# Patient Record
Sex: FEMALE | Race: BLACK OR AFRICAN AMERICAN
[De-identification: names, ages, dates, MRNs, and addresses within clinical notes are randomized per-mention and may not be internally consistent; named-entity substitution may affect disease eponyms.]

---

## 2018-02-09 ENCOUNTER — HOSPITAL ENCOUNTER (EMERGENCY)
Dept: HOSPITAL 88 - ER | Age: 48
LOS: 1 days | Discharge: HOME | End: 2018-02-10
Payer: COMMERCIAL

## 2018-02-09 VITALS — WEIGHT: 289 LBS | HEIGHT: 68 IN | BODY MASS INDEX: 43.8 KG/M2

## 2018-02-09 DIAGNOSIS — N30.00: ICD-10-CM

## 2018-02-09 DIAGNOSIS — Z86.73: ICD-10-CM

## 2018-02-09 DIAGNOSIS — R07.89: Primary | ICD-10-CM

## 2018-02-09 LAB
ALBUMIN SERPL-MCNC: 3.9 G/DL (ref 3.5–5)
ALBUMIN/GLOB SERPL: 0.8 {RATIO} (ref 0.8–2)
ALP SERPL-CCNC: 73 IU/L (ref 40–150)
ALT SERPL-CCNC: 18 IU/L (ref 0–55)
ANION GAP SERPL CALC-SCNC: 16.2 MMOL/L (ref 8–16)
BACTERIA URNS QL MICRO: (no result) /HPF
BASOPHILS # BLD AUTO: 0 10*3/UL (ref 0–0.1)
BASOPHILS NFR BLD AUTO: 0.3 % (ref 0–1)
BILIRUB UR QL: NEGATIVE
BUN SERPL-MCNC: 15 MG/DL (ref 7–26)
BUN/CREAT SERPL: 19 (ref 6–25)
CALCIUM SERPL-MCNC: 9.1 MG/DL (ref 8.4–10.2)
CHLORIDE SERPL-SCNC: 104 MMOL/L (ref 98–107)
CK MB SERPL-MCNC: 1.2 NG/ML (ref 0–5)
CK SERPL-CCNC: 137 IU/L (ref 29–168)
CLARITY UR: (no result)
CO2 SERPL-SCNC: 22 MMOL/L (ref 22–29)
COLOR UR: YELLOW
DEPRECATED NEUTROPHILS # BLD AUTO: 3.2 10*3/UL (ref 2.1–6.9)
DEPRECATED RBC URNS MANUAL-ACNC: (no result) /HPF (ref 0–5)
EGFRCR SERPLBLD CKD-EPI 2021: > 60 ML/MIN (ref 60–?)
EOSINOPHIL # BLD AUTO: 0.1 10*3/UL (ref 0–0.4)
EOSINOPHIL NFR BLD AUTO: 1.7 % (ref 0–6)
EPI CELLS URNS QL MICRO: (no result) /LPF
ERYTHROCYTE [DISTWIDTH] IN CORD BLOOD: 12.6 % (ref 11.7–14.4)
GLOBULIN PLAS-MCNC: 4.7 G/DL (ref 2.3–3.5)
GLUCOSE SERPLBLD-MCNC: 86 MG/DL (ref 74–118)
HCT VFR BLD AUTO: 41.2 % (ref 34.2–44.1)
HGB BLD-MCNC: 13.9 G/DL (ref 12–16)
KETONES UR QL STRIP.AUTO: NEGATIVE
LEUKOCYTE ESTERASE UR QL STRIP.AUTO: (no result)
LYMPHOCYTES # BLD: 2 10*3/UL (ref 1–3.2)
LYMPHOCYTES NFR BLD AUTO: 35.3 % (ref 18–39.1)
MCH RBC QN AUTO: 31.8 PG (ref 28–32)
MCHC RBC AUTO-ENTMCNC: 33.7 G/DL (ref 31–35)
MCV RBC AUTO: 94.3 FL (ref 81–99)
MONOCYTES # BLD AUTO: 0.4 10*3/UL (ref 0.2–0.8)
MONOCYTES NFR BLD AUTO: 7.1 % (ref 4.4–11.3)
NEUTS SEG NFR BLD AUTO: 55.4 % (ref 38.7–80)
NITRITE UR QL STRIP.AUTO: NEGATIVE
PLATELET # BLD AUTO: 216 X10E3/UL (ref 140–360)
POTASSIUM SERPL-SCNC: 5.2 MMOL/L (ref 3.5–5.1)
PROT UR QL STRIP.AUTO: NEGATIVE
RBC # BLD AUTO: 4.37 X10E6/UL (ref 3.6–5.1)
SODIUM SERPL-SCNC: 137 MMOL/L (ref 136–145)
SP GR UR STRIP: 1.02 (ref 1.01–1.02)
UROBILINOGEN UR STRIP-MCNC: 0.2 MG/DL (ref 0.2–1)
WBC #/AREA URNS HPF: (no result) /HPF (ref 0–5)

## 2018-02-09 PROCEDURE — 93005 ELECTROCARDIOGRAM TRACING: CPT

## 2018-02-09 PROCEDURE — 36415 COLL VENOUS BLD VENIPUNCTURE: CPT

## 2018-02-09 PROCEDURE — 82550 ASSAY OF CK (CPK): CPT

## 2018-02-09 PROCEDURE — 81001 URINALYSIS AUTO W/SCOPE: CPT

## 2018-02-09 PROCEDURE — 80053 COMPREHEN METABOLIC PANEL: CPT

## 2018-02-09 PROCEDURE — 71046 X-RAY EXAM CHEST 2 VIEWS: CPT

## 2018-02-09 PROCEDURE — 82553 CREATINE MB FRACTION: CPT

## 2018-02-09 PROCEDURE — 70450 CT HEAD/BRAIN W/O DYE: CPT

## 2018-02-09 PROCEDURE — 81025 URINE PREGNANCY TEST: CPT

## 2018-02-09 PROCEDURE — 87086 URINE CULTURE/COLONY COUNT: CPT

## 2018-02-09 PROCEDURE — 87186 SC STD MICRODIL/AGAR DIL: CPT

## 2018-02-09 PROCEDURE — 99284 EMERGENCY DEPT VISIT MOD MDM: CPT

## 2018-02-09 PROCEDURE — 78582 LUNG VENTILAT&PERFUS IMAGING: CPT

## 2018-02-09 PROCEDURE — 85379 FIBRIN DEGRADATION QUANT: CPT

## 2018-02-09 PROCEDURE — 84484 ASSAY OF TROPONIN QUANT: CPT

## 2018-02-09 PROCEDURE — 85025 COMPLETE CBC W/AUTO DIFF WBC: CPT

## 2018-02-09 NOTE — DIAGNOSTIC IMAGING REPORT
EXAMINATION:  CHEST 2 VIEW    



INDICATION:      

\S\[id number]

\S\[id number]    



COMPARISON:  None

     

FINDINGS:  PA and lateral views



TUBES and LINES:  None.



LUNGS:  Low lung volumes.  Lungs are clear.   There is no evidence of pneumonia

or pulmonary edema.



PLEURA:  No pleural effusion or pneumothorax.



HEART AND MEDIASTINUM:  The cardiomediastinal silhouette is unremarkable..  



BONES AND SOFT TISSUES:  Multilevel degenerative changes of the thoracic spine.

 Soft tissues are unremarkable.



UPPER ABDOMEN: No free air under the diaphragm. 



IMPRESSION: 

No acute thoracic abnormality.





Signed by: Dr. Gilma Yi M.D. on 2/9/2018 8:28 PM

## 2018-02-09 NOTE — DIAGNOSTIC IMAGING REPORT
EXAMINATION: Head CT without contrast.  





HISTORY:Left lower lip numbness for one day. 



COMPARISON:None.

TECHNIQUE: Multidetector axial images were obtained from the foramen magnum to

the vertex without contrast. The images were reconstructed using brain and bone

algorithms.  Thin section brain images were reformatted into coronal and

sagittal planes.  

Intravenous contrast: None  



IMAGE QUALITY: Acceptable.



FINDINGS:

    Skull/scalp: No abnormality.

    Parenchyma: No abnormal density.

No acute hemorrhage, mass or acute major vascular territorial infarct.

    Arteries: Atherosclerotic calcification in bilateral carotid siphon.

    Dural sinuses: No abnormal density suggestive of thrombosis. 

    Ventricles: No hydrocephalus or displacement.

    Extra-axial spaces: No abnormal density.  

    Brain volume: Normal for age.

    Craniocervical junction: No mass, Chiari malformation, or basilar

invagination.

    Sella: No mass. 

    Paranasal/mastoid sinuses: Imaged portions unremarkable.



IMPRESSION:

No intracranial abnormality.



Signed by: Dr. Shantal Hatch M.D. on 2/9/2018 8:26 PM

## 2018-02-10 NOTE — DIAGNOSTIC IMAGING REPORT
VQ LUNG  SCAN VENT   PERFUSION



Clinical Information: Pulmonary embolism, chest pain, numbness



Comparison: Chest x-ray on 2/9/2018



Discussion: Xenon-133 gas 14.4 mCi was administered via inhalation at 2350

hours.  Dynamic images of the lungs in the posterior projection were obtained

through single breath, equilibrium, and washout phases.  Distribution of tracer

activity appears physiologic throughout the lungs..  There are no segmental

ventilatory defects.  Washout of tracer is normal with no evidence of air

trapping.



Perfusion images of the lungs were obtained in multiple projections following

intravenous administration of approximately 6.2 mCi of Tc-99m MAA at 2355

hours. Distribution of tracer appears physiologic  throughout the lungs.  The

contours of the lungs are well demarcated.  There are no segmental perfusion

defects of any size.



The cardiomediastinal silhouette is unremarkable.



Impression: 



1.  Scan findings represent a VERY LOW probability for acute pulmonary embolic

disease based on the PIOPED II criteria.



Signed by: Dr. Pedro Luis Freedman M.D. on 2/10/2018 12:36 AM